# Patient Record
Sex: MALE | Race: WHITE | NOT HISPANIC OR LATINO | Employment: UNEMPLOYED | URBAN - NONMETROPOLITAN AREA
[De-identification: names, ages, dates, MRNs, and addresses within clinical notes are randomized per-mention and may not be internally consistent; named-entity substitution may affect disease eponyms.]

---

## 2017-03-25 ENCOUNTER — HOSPITAL ENCOUNTER (EMERGENCY)
Facility: HOSPITAL | Age: 37
Discharge: HOME | End: 2017-03-25
Attending: EMERGENCY MEDICINE | Admitting: EMERGENCY MEDICINE

## 2017-03-25 VITALS
OXYGEN SATURATION: 99 % | BODY MASS INDEX: 22.46 KG/M2 | DIASTOLIC BLOOD PRESSURE: 82 MMHG | HEIGHT: 74 IN | WEIGHT: 175 LBS | RESPIRATION RATE: 16 BRPM | HEART RATE: 101 BPM | TEMPERATURE: 98.4 F | SYSTOLIC BLOOD PRESSURE: 149 MMHG

## 2017-03-25 DIAGNOSIS — F19.10 SUBSTANCE ABUSE (HCC): Primary | ICD-10-CM

## 2017-03-25 PROCEDURE — 99282 EMERGENCY DEPT VISIT SF MDM: CPT

## 2017-03-25 NOTE — NURSING NOTE
Patient presented with fiance to detox from opiates. Couple does not want to go to separate places. Explain that we do not allow couples to be admitted to same unit. Both understood and wish to seek alternate options to detox together. Refused intake assessment. No SI or HI. Both supportive of each other. Detox packet provided and reviewed with couple. Both had a good understanding of their options.

## 2017-03-25 NOTE — DISCHARGE INSTRUCTIONS
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
## 2019-08-04 ENCOUNTER — HOSPITAL ENCOUNTER (EMERGENCY)
Facility: HOSPITAL | Age: 39
Discharge: HOME | End: 2019-08-04
Attending: EMERGENCY MEDICINE | Admitting: EMERGENCY MEDICINE

## 2019-08-04 VITALS
TEMPERATURE: 98.6 F | HEIGHT: 74 IN | SYSTOLIC BLOOD PRESSURE: 111 MMHG | HEART RATE: 82 BPM | RESPIRATION RATE: 18 BRPM | OXYGEN SATURATION: 100 % | DIASTOLIC BLOOD PRESSURE: 71 MMHG | BODY MASS INDEX: 23.74 KG/M2 | WEIGHT: 185 LBS

## 2019-08-04 DIAGNOSIS — F19.10 SUBSTANCE ABUSE (HCC): Primary | ICD-10-CM

## 2019-08-04 LAB
6-ACETYL MORPHINE: NEGATIVE
ALBUMIN SERPL-MCNC: 4 G/DL (ref 3.5–5.2)
ALBUMIN/GLOB SERPL: 1 G/DL
ALP SERPL-CCNC: 72 U/L (ref 39–117)
ALT SERPL W P-5'-P-CCNC: 26 U/L (ref 1–41)
AMPHET+METHAMPHET UR QL: POSITIVE
ANION GAP SERPL CALCULATED.3IONS-SCNC: 12.1 MMOL/L (ref 5–15)
AST SERPL-CCNC: 25 U/L (ref 1–40)
BARBITURATES UR QL SCN: NEGATIVE
BASOPHILS # BLD AUTO: 0.06 10*3/MM3 (ref 0–0.2)
BASOPHILS NFR BLD AUTO: 0.6 % (ref 0–1.5)
BENZODIAZ UR QL SCN: POSITIVE
BILIRUB SERPL-MCNC: 0.3 MG/DL (ref 0.2–1.2)
BILIRUB UR QL STRIP: NEGATIVE
BUN BLD-MCNC: 15 MG/DL (ref 6–20)
BUN/CREAT SERPL: 14.3 (ref 7–25)
BUPRENORPHINE SERPL-MCNC: POSITIVE NG/ML
CALCIUM SPEC-SCNC: 9.4 MG/DL (ref 8.6–10.5)
CANNABINOIDS SERPL QL: NEGATIVE
CHLORIDE SERPL-SCNC: 101 MMOL/L (ref 98–107)
CLARITY UR: CLEAR
CO2 SERPL-SCNC: 23.9 MMOL/L (ref 22–29)
COCAINE UR QL: NEGATIVE
COLOR UR: YELLOW
CREAT BLD-MCNC: 1.05 MG/DL (ref 0.76–1.27)
DEPRECATED RDW RBC AUTO: 42.6 FL (ref 37–54)
EOSINOPHIL # BLD AUTO: 0.7 10*3/MM3 (ref 0–0.4)
EOSINOPHIL NFR BLD AUTO: 7.1 % (ref 0.3–6.2)
ERYTHROCYTE [DISTWIDTH] IN BLOOD BY AUTOMATED COUNT: 13.7 % (ref 12.3–15.4)
ETHANOL BLD-MCNC: <10 MG/DL (ref 0–10)
ETHANOL UR QL: <0.01 %
GFR SERPL CREATININE-BSD FRML MDRD: 78 ML/MIN/1.73
GLOBULIN UR ELPH-MCNC: 4.1 GM/DL
GLUCOSE BLD-MCNC: 102 MG/DL (ref 65–99)
GLUCOSE UR STRIP-MCNC: NEGATIVE MG/DL
HCT VFR BLD AUTO: 38.3 % (ref 37.5–51)
HGB BLD-MCNC: 12.5 G/DL (ref 13–17.7)
HGB UR QL STRIP.AUTO: NEGATIVE
IMM GRANULOCYTES # BLD AUTO: 0.02 10*3/MM3 (ref 0–0.05)
IMM GRANULOCYTES NFR BLD AUTO: 0.2 % (ref 0–0.5)
KETONES UR QL STRIP: NEGATIVE
LEUKOCYTE ESTERASE UR QL STRIP.AUTO: NEGATIVE
LYMPHOCYTES # BLD AUTO: 2.28 10*3/MM3 (ref 0.7–3.1)
LYMPHOCYTES NFR BLD AUTO: 23.2 % (ref 19.6–45.3)
MAGNESIUM SERPL-MCNC: 2.2 MG/DL (ref 1.6–2.6)
MCH RBC QN AUTO: 27.9 PG (ref 26.6–33)
MCHC RBC AUTO-ENTMCNC: 32.6 G/DL (ref 31.5–35.7)
MCV RBC AUTO: 85.5 FL (ref 79–97)
METHADONE UR QL SCN: POSITIVE
MONOCYTES # BLD AUTO: 0.71 10*3/MM3 (ref 0.1–0.9)
MONOCYTES NFR BLD AUTO: 7.2 % (ref 5–12)
NEUTROPHILS # BLD AUTO: 6.05 10*3/MM3 (ref 1.7–7)
NEUTROPHILS NFR BLD AUTO: 61.7 % (ref 42.7–76)
NITRITE UR QL STRIP: NEGATIVE
OPIATES UR QL: NEGATIVE
OXYCODONE UR QL SCN: NEGATIVE
PCP UR QL SCN: NEGATIVE
PH UR STRIP.AUTO: 5.5 [PH] (ref 5–8)
PLATELET # BLD AUTO: 337 10*3/MM3 (ref 140–450)
PMV BLD AUTO: 9.3 FL (ref 6–12)
POTASSIUM BLD-SCNC: 4.3 MMOL/L (ref 3.5–5.2)
PROT SERPL-MCNC: 8.1 G/DL (ref 6–8.5)
PROT UR QL STRIP: NEGATIVE
RBC # BLD AUTO: 4.48 10*6/MM3 (ref 4.14–5.8)
SODIUM BLD-SCNC: 137 MMOL/L (ref 136–145)
SP GR UR STRIP: 1.02 (ref 1–1.03)
UROBILINOGEN UR QL STRIP: NORMAL
WBC NRBC COR # BLD: 9.82 10*3/MM3 (ref 3.4–10.8)

## 2019-08-04 PROCEDURE — 80307 DRUG TEST PRSMV CHEM ANLYZR: CPT | Performed by: FAMILY MEDICINE

## 2019-08-04 PROCEDURE — 83735 ASSAY OF MAGNESIUM: CPT | Performed by: FAMILY MEDICINE

## 2019-08-04 PROCEDURE — 80053 COMPREHEN METABOLIC PANEL: CPT | Performed by: FAMILY MEDICINE

## 2019-08-04 PROCEDURE — 81003 URINALYSIS AUTO W/O SCOPE: CPT | Performed by: FAMILY MEDICINE

## 2019-08-04 PROCEDURE — 36415 COLL VENOUS BLD VENIPUNCTURE: CPT

## 2019-08-04 PROCEDURE — 99284 EMERGENCY DEPT VISIT MOD MDM: CPT

## 2019-08-04 PROCEDURE — 85025 COMPLETE CBC W/AUTO DIFF WBC: CPT | Performed by: FAMILY MEDICINE

## 2019-08-04 RX ORDER — ONDANSETRON 4 MG/1
4 TABLET, ORALLY DISINTEGRATING ORAL ONCE
Status: COMPLETED | OUTPATIENT
Start: 2019-08-04 | End: 2019-08-04

## 2019-08-04 RX ADMIN — ONDANSETRON 4 MG: 4 TABLET, ORALLY DISINTEGRATING ORAL at 10:20

## 2019-08-04 NOTE — NURSING NOTE
Spoke to Garret rosales [facility]. Instructed they are out of network for [facility]. Suggested calling [facility].     Patient made aware. Instructed that if he is not going to get anything here he will just go to the house.     Called and provided information to Dr. Rubalcava. Instructed to have him follow up with the [facility] in the am.     Pt verbalized information. ED provider made aware of plan of care.

## 2019-08-04 NOTE — NURSING NOTE
"Patient asked staff how long it will be. Explained TF can take a while. Pt states that he cant go much longer without something. Asked patient what he needed. He asked staff \"dont u give suboxone here for opiates. I need it or I cant stay much longer\". Explained to the pt that typically they treat the symptoms and do not usually give suboxone in the ED.      Pt asked to speak to family via phone. On the phone with mother and reports that he wants to come home and asked if she can get a ride if so then he will leave. Pt reports that they wont come get him so he will have to just wait on [facility].   "

## 2019-08-04 NOTE — NURSING NOTE
Spoke with pt regarding  service. Pt does have VA insurance. Instructed him process regarding care of [facility] patients. Instructed that he would be ok to be sent to VA as long as he was not going to be sick in the meantime. Explained to him that I already notified ED provider of his request for nausea medicine.     Notified ED provider that pt has VA benefits and will need her to start process to send this pt to the [facility]. Instructed that she will wait for labs. Information verbalized.

## 2019-08-04 NOTE — NURSING NOTE
Waiting on provider at [facility] to review information. Pt within site of staff and will continue to monitor.

## 2019-08-04 NOTE — NURSING NOTE
Per ED provider. She contacted the [facility] and not beds at at this time.    Spoke to Dr. Rubalcava. Intake information provided to him.instructed him no [facility] beds, but provider would like pt to be sent somewhere for treatment.      Instructed  that this pt  came in at the same time as another patient that  He had admitted to the [facility] unit already.     Initially the two patient denied knowing each other but then admitted to intake that they are together.      Instructed that in this case generally they are not to be admitted on the same unit. due to conflict in interest of pt care. And  with them being together he cannot be admitted on the unit. if pt is not SI or HI and wants detox Instructed to try to TF him somewhere like [facility].

## 2019-08-04 NOTE — NURSING NOTE
Spoke with pt. Instructed that he will need to discuss this with his friend before she goes to the unit.     Instructed that he guesses he will go. But did not know they couldn't go to the unit together and reports that they are just friends. Instructed him that if [facility] had [facility] then they are more than just friends. Pt shook head up and down. Reports that they both just want help. Emotional support provided to pt. Explained that staff will try to get him help to get in somewhere but cannot put them on the same unit per policy. Information verbalized.

## 2019-08-04 NOTE — NURSING NOTE
Clinical information faxed to Garret demp[facility]. Waiting on return call.

## 2019-08-04 NOTE — NURSING NOTE
Patient presents today and says that he is dope sick. He reports that he needs help coming off of a lot of opiates. He reports that he has not had any in a few days and that he had to get some methadone and a xanax yesterday cause he couldn't take it. Has a hx of getting very sick when he is coming off of opiates. He reports that he is using about 60 to 100mg of different types of opiates a day Iv. Oxymorphone. deni Morris. COWS score 16. Notified ED provider that pt is asking for nausea medicine at this time. Denies SI or HI.

## 2019-08-15 NOTE — ED PROVIDER NOTES
Subjective   Pt states he is addicted to opiates.  Hx of iv drug abuse   No si/hi        History provided by:  Patient   used: No    Drug / Alcohol Assessment   Primary symptoms include agitation. This is a recurrent problem. The current episode started 2 days ago. Suspected agents include opiates. Pertinent negatives include no fever, no nausea and no vomiting. Associated medical issues include addiction treatment.       Review of Systems   Constitutional: Negative for chills and fever.   HENT: Negative for congestion, ear pain and sore throat.    Respiratory: Negative for cough, shortness of breath and wheezing.    Cardiovascular: Negative for chest pain.   Gastrointestinal: Negative for diarrhea, nausea and vomiting.   Genitourinary: Negative for dysuria and flank pain.   Skin: Negative for rash.   Neurological: Negative for headaches.   Psychiatric/Behavioral: Positive for agitation. Negative for dysphoric mood and suicidal ideas. The patient is nervous/anxious.    All other systems reviewed and are negative.      Past Medical History:   Diagnosis Date   • PTSD (post-traumatic stress disorder)    • Substance abuse (CMS/HCC)        No Known Allergies    History reviewed. No pertinent surgical history.    No family history on file.    Social History     Socioeconomic History   • Marital status: Single     Spouse name: Not on file   • Number of children: Not on file   • Years of education: Not on file   • Highest education level: Not on file   Tobacco Use   • Smoking status: Never Smoker   • Smokeless tobacco: Never Used   Substance and Sexual Activity   • Alcohol use: No     Frequency: Never   • Drug use: Yes   • Sexual activity: Yes     Partners: Female     Birth control/protection: None           Objective   Physical Exam   Constitutional: He is oriented to person, place, and time. He appears well-developed and well-nourished.   HENT:   Head: Normocephalic.   Mouth/Throat: Oropharynx is clear  and moist.   Neck: Neck supple.   Cardiovascular: Normal rate and regular rhythm.   Pulmonary/Chest: Effort normal and breath sounds normal.   Abdominal: Soft. Bowel sounds are normal. There is no tenderness.   Musculoskeletal: Normal range of motion.   Neurological: He is alert and oriented to person, place, and time.   Skin: Skin is warm and dry.   Psychiatric: His behavior is normal. Judgment normal. His mood appears anxious. He expresses suicidal ideation. He expresses no homicidal ideation. He expresses suicidal plans. He expresses no homicidal plans.   Nursing note and vitals reviewed.      Procedures           ED Course  ED Course as of Aug 15 1416   Sun Aug 04, 2019   1020 No beds per [facility]   [LC]   1056 RDW-SD: 42.6 [ML]   1156 INTAKE NURSE D/W [facility] WAS ACCEPTED   [facility] DECLINED [facility] ADMISSION WANTS [facility] TO F/U AT [facility]   [LC]      ED Course User Index  [LC] Riddhi Cardozo PA  [ML] Roxy Mock PA                  [facility]      Final diagnoses:   Substance abuse (CMS/[facility])            Riddhi Cardozo PA  08/15/19 1416